# Patient Record
Sex: MALE | Race: WHITE | NOT HISPANIC OR LATINO | URBAN - NONMETROPOLITAN AREA
[De-identification: names, ages, dates, MRNs, and addresses within clinical notes are randomized per-mention and may not be internally consistent; named-entity substitution may affect disease eponyms.]

---

## 2021-01-14 ENCOUNTER — IMPORTED ENCOUNTER (OUTPATIENT)
Dept: URBAN - NONMETROPOLITAN AREA CLINIC 1 | Facility: CLINIC | Age: 21
End: 2021-01-14

## 2021-01-14 PROBLEM — H52.223: Noted: 2021-01-14

## 2021-01-14 PROBLEM — H52.13: Noted: 2021-01-14

## 2021-01-14 PROCEDURE — 92015 DETERMINE REFRACTIVE STATE: CPT

## 2021-01-14 PROCEDURE — 92004 COMPRE OPH EXAM NEW PT 1/>: CPT

## 2021-01-14 NOTE — PATIENT DISCUSSION
Compound Myopic Astigmatsim OU-  discussed findnings w/ patient-  new spectacle Rx issued today-  monitor yearly or prn; 's Notes: MR 1/14/2021DFE 1/14/2021

## 2022-01-17 ENCOUNTER — IMPORTED ENCOUNTER (OUTPATIENT)
Dept: URBAN - NONMETROPOLITAN AREA CLINIC 1 | Facility: CLINIC | Age: 22
End: 2022-01-17

## 2022-01-17 PROCEDURE — 92014 COMPRE OPH EXAM EST PT 1/>: CPT

## 2022-01-17 PROCEDURE — 92015 DETERMINE REFRACTIVE STATE: CPT

## 2022-01-17 NOTE — PATIENT DISCUSSION
Compound Myopic Astigmatsim OU-  discussed findings w/ patient-  new spectacle Rx issued today-  monitor yearly or prn; 's Notes: MR 1/17/2022DFE 1/17/2022

## 2022-04-15 ASSESSMENT — TONOMETRY
OS_IOP_MMHG: 16
OD_IOP_MMHG: 16
OS_IOP_MMHG: 16
OD_IOP_MMHG: 17

## 2022-04-15 ASSESSMENT — VISUAL ACUITY
OS_SC: 20/30
OS_SC: 20/30-1
OD_SC: 20/30-1
OU_CC: 20/20
OS_CC: CF6'
OD_CC: 20/400

## 2023-01-11 ENCOUNTER — COMPREHENSIVE EXAM (OUTPATIENT)
Dept: URBAN - NONMETROPOLITAN AREA CLINIC 4 | Facility: CLINIC | Age: 23
End: 2023-01-11

## 2023-01-11 DIAGNOSIS — H52.13: ICD-10-CM

## 2023-01-11 PROCEDURE — 92015 DETERMINE REFRACTIVE STATE: CPT

## 2023-01-11 PROCEDURE — 92014 COMPRE OPH EXAM EST PT 1/>: CPT

## 2023-01-11 ASSESSMENT — TONOMETRY
OD_IOP_MMHG: 18
OS_IOP_MMHG: 18

## 2023-01-11 ASSESSMENT — VISUAL ACUITY
OD_CC: 20/25
OS_CC: 20/25-1

## 2023-01-11 NOTE — PATIENT DISCUSSION
Recommend LASIK OU. Risks and benefits discussed with the patient. Proceed to schedule LASIK Eval with Dr. Beck Aguayo when patient is ready. Continue to monitor.

## 2024-06-05 ENCOUNTER — COMPREHENSIVE EXAM (OUTPATIENT)
Dept: RURAL CLINIC 1 | Facility: CLINIC | Age: 24
End: 2024-06-05

## 2024-06-05 DIAGNOSIS — H52.223: ICD-10-CM

## 2024-06-05 DIAGNOSIS — H52.13: ICD-10-CM

## 2024-06-05 PROCEDURE — 92015 DETERMINE REFRACTIVE STATE: CPT

## 2024-06-05 PROCEDURE — 92014 COMPRE OPH EXAM EST PT 1/>: CPT

## 2024-06-05 ASSESSMENT — VISUAL ACUITY
OS_CC: 20/20
OS_CC: 20/20
OD_CC: 20/20
OD_CC: 20/20

## 2024-06-05 ASSESSMENT — TONOMETRY
OS_IOP_MMHG: 14
OD_IOP_MMHG: 14

## 2025-07-21 ENCOUNTER — COMPREHENSIVE EXAM (OUTPATIENT)
Age: 25
End: 2025-07-21

## 2025-07-21 DIAGNOSIS — H52.13: ICD-10-CM

## 2025-07-21 DIAGNOSIS — H52.223: ICD-10-CM

## 2025-07-21 PROCEDURE — 92014 COMPRE OPH EXAM EST PT 1/>: CPT

## 2025-07-21 PROCEDURE — 92015 DETERMINE REFRACTIVE STATE: CPT
